# Patient Record
Sex: FEMALE | URBAN - METROPOLITAN AREA
[De-identification: names, ages, dates, MRNs, and addresses within clinical notes are randomized per-mention and may not be internally consistent; named-entity substitution may affect disease eponyms.]

---

## 2024-01-08 ENCOUNTER — TELEPHONE (OUTPATIENT)
Dept: TRANSPLANT | Facility: CLINIC | Age: 39
End: 2024-01-08

## 2024-01-08 NOTE — TELEPHONE ENCOUNTER
"Spoke with referring provider office, Maribell, coordinator stated that the patient  on 24 at 5:22am. Updated Epic. Post mortem documentation completed.  ----- Message from Edna Mccarty DO sent at 2024  8:25 AM CST -----  Regarding: RE: LUT Referral  Too sick for us.  Recommend that they reach out elsewhere.  We've had success transferring patients on ECMO to [facility].  They may want to check there.  Thanks    ----- Message -----  From: Sindhu Martinez  Sent: 2024   8:20 AM CST  To: Edna Mccarty DO  Subject: RE: LUT Referral                                 Yes, her condition appears extremely critical. I can route complete records if you prefer.  ----- Message -----  From: Edna Mccarty DO  Sent: 2024   8:14 AM CST  To: Sindhu Martinez  Subject: RE: LUT Referral                                 So she's still hospitalized on ecmo?    ----- Message -----  From: Sindhu Martinez  Sent: 2024   6:39 AM CST  To: Edna Mccarty DO  Subject: LUT Referral                                     Lung Transplant Referral Note     Referral from: Dr. Jero Ross ([place], [place]) - currently hospitalized since 2023, trach 10/11/23. 10/6: intubated, initiated VV Ecmo. PEG 23. Ecmo circuit change 23.   ID following patient for transient fever, AMS. Previous admission -10/1/2023, d/c'ed home on 2L.     Lung diagnosis: ILD (hx. "Vaping related pneumonitis"/tobacco use)     Age: 38 y.o. female    BMI:  29.9 kg/m²     Smoking history: Social History     Tobacco Use       Smoking status: current (up until admit in 2023)         Packs/day: not stated         Years: not stated         Pack years: not stated         Types: Vaping, tobacco use         Quit date: n/a         Years since quitting: n/a       Smokeless tobacco: not stated      Other Drug use: opiates, benzos - per documentation patient living in sobriety " group home for previous eight months to maintain sobriety prior to admission    PFT date: not received     6 MWT date: patient bed bound. On ECMO since October 2023.       Chest CT date: 1/3/24   Impression:   Moderate sized, thick walled and complex appearing right hydropneumothorax despite pigtail placement; 6x4 right upper lobe hemorrhagic bulla; large posterolateral chest wall hematoma      Echo date:   Impression:   EF 55%, previously noted RVSP 35 mmHg on October echo.       Other pertinent medical history: postpartum cardiomyopathy, HTN, history of opiate/benzo addiction     Recommendations?

## 2024-01-10 ENCOUNTER — POST MORTEM DOCUMENTATION (OUTPATIENT)
Dept: TRANSPLANT | Facility: CLINIC | Age: 39
End: 2024-01-10

## 2024-01-10 NOTE — PROGRESS NOTES
Received report from Maribell Johnson, coordinator at [facility]. Patient  on 24. Updated status in Epic.